# Patient Record
Sex: FEMALE | Race: WHITE | URBAN - METROPOLITAN AREA
[De-identification: names, ages, dates, MRNs, and addresses within clinical notes are randomized per-mention and may not be internally consistent; named-entity substitution may affect disease eponyms.]

---

## 2019-07-16 ENCOUNTER — Encounter (OUTPATIENT)
Dept: URBAN - METROPOLITAN AREA CLINIC 71 | Facility: CLINIC | Age: 64
End: 2019-07-16
Payer: MEDICARE

## 2019-07-16 PROCEDURE — 99214 OFFICE O/P EST MOD 30 MIN: CPT | Performed by: OPTOMETRIST

## 2020-01-03 ENCOUNTER — Encounter (OUTPATIENT)
Dept: URBAN - METROPOLITAN AREA CLINIC 71 | Facility: CLINIC | Age: 65
End: 2020-01-03
Payer: MEDICARE

## 2020-01-03 PROCEDURE — 99212 OFFICE O/P EST SF 10 MIN: CPT | Performed by: OPHTHALMOLOGY

## 2021-05-03 ENCOUNTER — OFFICE VISIT (OUTPATIENT)
Dept: URBAN - METROPOLITAN AREA CLINIC 71 | Facility: CLINIC | Age: 66
End: 2021-05-03
Payer: MEDICARE

## 2021-05-03 DIAGNOSIS — H04.123 DRY EYE SYNDROME OF BILATERAL LACRIMAL GLANDS: ICD-10-CM

## 2021-05-03 DIAGNOSIS — E11.9 TYPE 2 DIABETES MELLITUS WITHOUT COMPLICATIONS: Primary | ICD-10-CM

## 2021-05-03 PROCEDURE — 99213 OFFICE O/P EST LOW 20 MIN: CPT | Performed by: OPHTHALMOLOGY

## 2021-05-03 PROCEDURE — 92133 CPTRZD OPH DX IMG PST SGM ON: CPT | Performed by: OPHTHALMOLOGY

## 2021-05-03 ASSESSMENT — INTRAOCULAR PRESSURE
OS: 26
OD: 29

## 2021-05-03 NOTE — IMPRESSION/PLAN
Impression: Age-related nuclear cataract, bilateral: H25.13. Cataracts do not require treatment unless they interfere with vision and impact one's activities of daily living, in which case cataract extraction with lens implant insertion should be performed. Cataracts occur in everyone as they age. Plan: No treatment currently recommended. The patient will monitor vision changes and contact us with any decrease in vision.

## 2021-05-03 NOTE — IMPRESSION/PLAN
Impression: Dry eye syndrome of bilateral lacrimal glands: H04.123. Plan: Recommend patient to D/C use of pred. IOP is elevated today likely due to pred use. Okay to continue use of pazeo and artificial tears. Recommend use of cool compresses if eyes become itchy. Will have patient return in 1 month for an IOP check off pred.

## 2021-05-03 NOTE — IMPRESSION/PLAN
Impression: Type 2 diabetes mellitus without complications: W35.9. Plan: Diabetes type II: no background retinopathy, no signs of neovascularization noted. Discussed ocular and systemic benefits of blood sugar control.

## 2021-05-28 ENCOUNTER — OFFICE VISIT (OUTPATIENT)
Dept: URBAN - METROPOLITAN AREA CLINIC 71 | Facility: CLINIC | Age: 66
End: 2021-05-28
Payer: MEDICARE

## 2021-05-28 DIAGNOSIS — H25.13 AGE-RELATED NUCLEAR CATARACT, BILATERAL: ICD-10-CM

## 2021-05-28 PROCEDURE — 99212 OFFICE O/P EST SF 10 MIN: CPT | Performed by: OPHTHALMOLOGY

## 2021-05-28 ASSESSMENT — INTRAOCULAR PRESSURE
OS: 18
OD: 17

## 2021-05-28 NOTE — IMPRESSION/PLAN
Impression: Age-related nuclear cataract, bilateral: H25.13. Plan: Discussed options, surgery or spectacle change. Explained surgery risks, benefits, procedures and recovery. Patient defers surgery and elects to change glasses first.  If there is no improvement, ok to schedule surgery.

## 2021-05-28 NOTE — IMPRESSION/PLAN
Impression: Type 2 diabetes mellitus without complications: S53.4. Plan: Diabetes type II: no background retinopathy, no signs of neovascularization noted. Discussed ocular and systemic benefits of blood sugar control.

## 2021-05-28 NOTE — IMPRESSION/PLAN
Impression: Dry eye syndrome of bilateral lacrimal glands: H04.123. Plan: Recommend stopping the pred, due to patient being on multiple steroids for other medical reasons. Recommended to use an ointment at night for the burning.

## 2021-08-16 ENCOUNTER — OFFICE VISIT (OUTPATIENT)
Dept: URBAN - METROPOLITAN AREA CLINIC 71 | Facility: CLINIC | Age: 66
End: 2021-08-16
Payer: MEDICARE

## 2021-08-16 DIAGNOSIS — H10.45 OTHER CHRONIC ALLERGIC CONJUNCTIVITIS: Primary | ICD-10-CM

## 2021-08-16 DIAGNOSIS — H52.4 PRESBYOPIA: ICD-10-CM

## 2021-08-16 PROCEDURE — 92012 INTRM OPH EXAM EST PATIENT: CPT | Performed by: OPTOMETRIST

## 2021-08-16 ASSESSMENT — INTRAOCULAR PRESSURE
OD: 10
OS: 13

## 2021-08-16 ASSESSMENT — VISUAL ACUITY
OS: 20/20
OD: 20/20

## 2021-08-16 NOTE — IMPRESSION/PLAN
Impression: Other chronic allergic conjunctivitis: H10.45. Bilateral. Pt requesting PF, non-steroid drops. Plan: Discussed. Advise the use of [place] PF drops. Call if symptoms do not resolve or if worsens.